# Patient Record
Sex: MALE | Race: WHITE | NOT HISPANIC OR LATINO | Employment: PART TIME | URBAN - METROPOLITAN AREA
[De-identification: names, ages, dates, MRNs, and addresses within clinical notes are randomized per-mention and may not be internally consistent; named-entity substitution may affect disease eponyms.]

---

## 2018-05-31 ENCOUNTER — HOSPITAL ENCOUNTER (EMERGENCY)
Facility: HOSPITAL | Age: 23
Discharge: HOME | End: 2018-05-31
Attending: EMERGENCY MEDICINE
Payer: MEDICAID

## 2018-05-31 VITALS
WEIGHT: 190 LBS | HEIGHT: 75 IN | DIASTOLIC BLOOD PRESSURE: 64 MMHG | OXYGEN SATURATION: 97 % | BODY MASS INDEX: 23.62 KG/M2 | RESPIRATION RATE: 15 BRPM | HEART RATE: 76 BPM | SYSTOLIC BLOOD PRESSURE: 133 MMHG | TEMPERATURE: 99 F

## 2018-05-31 DIAGNOSIS — R10.9 ABDOMINAL PAIN: ICD-10-CM

## 2018-05-31 DIAGNOSIS — K92.2 UGI BLEED: Primary | ICD-10-CM

## 2018-05-31 LAB
ALBUMIN SERPL BCP-MCNC: 4.4 G/DL
ALP SERPL-CCNC: 63 U/L
ALT SERPL W/O P-5'-P-CCNC: 19 U/L
ANION GAP SERPL CALC-SCNC: 11 MMOL/L
AST SERPL-CCNC: 20 U/L
BASOPHILS # BLD AUTO: 0.01 K/UL
BASOPHILS NFR BLD: 0.1 %
BILIRUB SERPL-MCNC: 0.8 MG/DL
BUN SERPL-MCNC: 9 MG/DL
CALCIUM SERPL-MCNC: 10.1 MG/DL
CHLORIDE SERPL-SCNC: 102 MMOL/L
CO2 SERPL-SCNC: 26 MMOL/L
CREAT SERPL-MCNC: 0.9 MG/DL
DIFFERENTIAL METHOD: ABNORMAL
EOSINOPHIL # BLD AUTO: 0.2 K/UL
EOSINOPHIL NFR BLD: 1.9 %
ERYTHROCYTE [DISTWIDTH] IN BLOOD BY AUTOMATED COUNT: 12.6 %
EST. GFR  (AFRICAN AMERICAN): >60 ML/MIN/1.73 M^2
EST. GFR  (NON AFRICAN AMERICAN): >60 ML/MIN/1.73 M^2
GLUCOSE SERPL-MCNC: 83 MG/DL
HCT VFR BLD AUTO: 45.3 %
HGB BLD-MCNC: 16.1 G/DL
LYMPHOCYTES # BLD AUTO: 1.7 K/UL
LYMPHOCYTES NFR BLD: 16.3 %
MCH RBC QN AUTO: 29.3 PG
MCHC RBC AUTO-ENTMCNC: 35.5 G/DL
MCV RBC AUTO: 83 FL
MONOCYTES # BLD AUTO: 0.9 K/UL
MONOCYTES NFR BLD: 8.3 %
NEUTROPHILS # BLD AUTO: 7.7 K/UL
NEUTROPHILS NFR BLD: 73 %
PLATELET # BLD AUTO: 322 K/UL
PMV BLD AUTO: 10.3 FL
POTASSIUM SERPL-SCNC: 4.8 MMOL/L
PROT SERPL-MCNC: 7.8 G/DL
RBC # BLD AUTO: 5.49 M/UL
SODIUM SERPL-SCNC: 139 MMOL/L
WBC # BLD AUTO: 10.49 K/UL

## 2018-05-31 PROCEDURE — 80053 COMPREHEN METABOLIC PANEL: CPT

## 2018-05-31 PROCEDURE — 85025 COMPLETE CBC W/AUTO DIFF WBC: CPT

## 2018-05-31 PROCEDURE — 63600175 PHARM REV CODE 636 W HCPCS: Performed by: NURSE PRACTITIONER

## 2018-05-31 PROCEDURE — 96374 THER/PROPH/DIAG INJ IV PUSH: CPT

## 2018-05-31 PROCEDURE — 99284 EMERGENCY DEPT VISIT MOD MDM: CPT | Mod: 25

## 2018-05-31 PROCEDURE — 25000003 PHARM REV CODE 250: Performed by: NURSE PRACTITIONER

## 2018-05-31 RX ORDER — OMEPRAZOLE 20 MG/1
20 CAPSULE, DELAYED RELEASE ORAL DAILY
Qty: 7 CAPSULE | Refills: 0 | Status: SHIPPED | OUTPATIENT
Start: 2018-05-31 | End: 2018-06-07

## 2018-05-31 RX ORDER — ONDANSETRON 2 MG/ML
4 INJECTION INTRAMUSCULAR; INTRAVENOUS
Status: COMPLETED | OUTPATIENT
Start: 2018-05-31 | End: 2018-05-31

## 2018-05-31 RX ADMIN — SODIUM CHLORIDE 1000 ML: 0.9 INJECTION, SOLUTION INTRAVENOUS at 05:05

## 2018-05-31 RX ADMIN — ONDANSETRON HYDROCHLORIDE 4 MG: 2 INJECTION INTRAMUSCULAR; INTRAVENOUS at 05:05

## 2018-05-31 NOTE — ED PROVIDER NOTES
Encounter Date: 5/31/2018  SORT MSE:  Pt is a 22 y.o. male who presents for emergent consideration for vomiting blood, abd pain.. Pt will be moved to room when one is available, otherwise will wait in waiting room with triage nurse supervision.  Pt arrived by ambulatory. He is not in distress. Orders have been placed. RADHA Paredes DNP [facility] 05/31/2018 1657    SCRIBE #1 NOTE: I, Shana Capps, am scribing for, and in the presence of,  Gonzalez Johnson MD. I have scribed the following portions of the note - Other sections scribed: HPI, ROS, and PE.       History     Chief Complaint   Patient presents with    Hematemesis     bright red blood in vomit x 3 days also dark stool for same.  Hx Heavy drinker and smoker.  states has not smoked or drank in 3 days.     CC: Hematemesis    HPI: This 22 y.o male presents to the ED c/o episodes of hematemesis x 3 that occurred today.  Patient reports having nausea and a cough with white sputum on today as well.  Patient reports intermittently having episodes of hematemesis for the past year.  He endorses that he is a heavy alcohol drinker.  He denies any use of NSAIDS.  Patient reports of normal brown stools.  He denies fever, chills, chest pain, back pain, rash, dysuria, or any other associated symptoms.  No recent surgeries.  No alleviating factors.        The history is provided by the patient. No  was used.     Review of patient's allergies indicates:  Allergies not on file  Past Medical History:   Diagnosis Date    Undescended testes      Past Surgical History:   Procedure Laterality Date    ADENOIDECTOMY       History reviewed. No pertinent family history.  Social History   Substance Use Topics    Smoking status: Current Every Day Smoker     Types: Cigarettes    Smokeless tobacco: Never Used    Alcohol use Yes      Comment: 5 times per week     Review of Systems   Constitutional: Negative for chills and fever.   HENT: Negative for ear pain and sore  throat.    Eyes: Negative for pain.   Respiratory: Positive for cough. Negative for shortness of breath.    Cardiovascular: Negative for chest pain.   Gastrointestinal: Positive for nausea and vomiting (hematemesis). Negative for abdominal pain, blood in stool, constipation and diarrhea.   Genitourinary: Negative for dysuria.   Musculoskeletal: Negative for back pain.   Skin: Negative for rash.   Neurological: Negative for weakness, numbness and headaches.       Physical Exam     Initial Vitals [05/31/18 1658]   BP Pulse Resp Temp SpO2   119/61 85 15 98.5 °F (36.9 °C) 96 %      MAP       80.33         Physical Exam    Nursing note and vitals reviewed.  Constitutional: Vital signs are normal. He appears well-developed and well-nourished. He is active.  Non-toxic appearance. No distress.   HENT:   Head: Normocephalic and atraumatic.   Mouth/Throat: Oropharynx is clear and moist. No oropharyngeal exudate.   Eyes: Conjunctivae and EOM are normal. Pupils are equal, round, and reactive to light.   Neck: Trachea normal and normal range of motion. Neck supple.   Cardiovascular: Normal rate, regular rhythm and normal heart sounds. Exam reveals no gallop and no friction rub.    No murmur heard.  Pulmonary/Chest: Breath sounds normal. No respiratory distress.   Abdominal: Soft. Normal appearance and bowel sounds are normal. He exhibits no distension and no mass. There is no tenderness. There is no rebound and no guarding.   Genitourinary: Rectal exam shows guaiac negative stool. Guaiac negative stool.   Musculoskeletal: Normal range of motion. He exhibits no edema.   Neurological: He is alert and oriented to person, place, and time. He has normal strength.   Skin: Skin is warm, dry and intact. No rash noted. No erythema.   Psychiatric: He has a normal mood and affect.         ED Course   Procedures  Labs Reviewed   CBC W/ AUTO DIFFERENTIAL - Abnormal; Notable for the following:        Result Value    Lymph% 16.3 (*)     All  other components within normal limits   COMPREHENSIVE METABOLIC PANEL   POCT OCCULT BLOOD STOOL             Medical Decision Making:   Clinical Tests:   Lab Tests: Reviewed       <> Summary of Lab: Results for GRIFFIN CASTELLANOS (MRN [id number]) as of 5/31/2018 18:09    5/31/2018 17:21  WBC: 10.49  RBC: 5.49  Hemoglobin: 16.1  Hematocrit: 45.3  MCV: 83  MCH: 29.3  MCHC: 35.5  RDW: 12.6  Platelets: 322  MPV: 10.3  Gran%: 73.0  Gran # (ANC): 7.7  Lymph%: 16.3 (L)  Lymph #: 1.7  Mono%: 8.3  Mono #: 0.9  Eosinophil%: 1.9  Eos #: 0.2  Basophil%: 0.1  Baso #: 0.01  Sodium: 139  Potassium: 4.8  Chloride: 102  CO2: 26  Anion Gap: 11  BUN, Bld: 9  Creatinine: 0.9  eGFR if non : >60  eGFR if African American: >60  Glucose: 83  Calcium: 10.1  Alkaline Phosphatase: 63  Total Protein: 7.8  Albumin: 4.4  Total Bilirubin: 0.8  AST: 20  ALT: 19    5/31/2018 17:43    Radiological Study: Reviewed  ED Management:  EXAMINATION:  XR ABDOMEN FLAT AND ERECT    CLINICAL HISTORY:  Unspecified abdominal pain    TECHNIQUE:  Flat and erect AP views of the abdomen were performed.    COMPARISON:  None    FINDINGS:  Two upright views, 2 supine views.    No significant air-fluid levels on upright view.  Air and stool is seen within the large bowel, and projected over the rectum.  There is ingested content within the gastric lumen.  No large volume free air or pneumatosis.  There are a few punctate foci of calcification projected in the region of the right kidney, suggesting nephrolithiasis.  The lower lung zones are grossly clear.  No acute osseous abnormality.  Impression       1. Nonobstructive bowel gas pattern.  2. Possible right nephrolithiasis.      Electronically signed by: Angel Nunez MD  Date: 05/31/2018  Time: 17:52      Stool is brown and heme negative patient is not actively bleeding and VS are stable will discharge on PPI, advise not to drink ETOH F/U with   GI            Scribe Attestation:   Scribe #1: I performed the  above scribed service and the documentation accurately describes the services I performed. I attest to the accuracy of the note.    Attending Attestation:           Physician Attestation for Scribe:  Physician Attestation Statement for Scribe #1: I, Gonzalez Johnson MD, reviewed documentation, as scribed by Shana Capps in my presence, and it is both accurate and complete.                    Clinical Impression:   The primary encounter diagnosis was UGI bleed. A diagnosis of Abdominal pain was also pertinent to this visit.    Disposition:   Disposition: Discharged  Condition: Stable                        Gonzalez Johnson MD  06/05/18 0360

## 2018-05-31 NOTE — ED TRIAGE NOTES
Seen by urgent today for blood tinged vomitus x 3 this week. Black stool noted. Reports barking cough, chest congestion x 1 week